# Patient Record
Sex: FEMALE | Race: WHITE | NOT HISPANIC OR LATINO | Employment: FULL TIME | URBAN - METROPOLITAN AREA
[De-identification: names, ages, dates, MRNs, and addresses within clinical notes are randomized per-mention and may not be internally consistent; named-entity substitution may affect disease eponyms.]

---

## 2024-07-25 ENCOUNTER — APPOINTMENT (OUTPATIENT)
Dept: CARDIOLOGY | Facility: HOSPITAL | Age: 29
End: 2024-07-25

## 2024-07-25 ENCOUNTER — HOSPITAL ENCOUNTER (EMERGENCY)
Facility: HOSPITAL | Age: 29
Discharge: HOME | End: 2024-07-25

## 2024-07-25 VITALS
OXYGEN SATURATION: 98 % | HEART RATE: 96 BPM | WEIGHT: 225 LBS | SYSTOLIC BLOOD PRESSURE: 134 MMHG | BODY MASS INDEX: 37.49 KG/M2 | DIASTOLIC BLOOD PRESSURE: 85 MMHG | TEMPERATURE: 97 F | RESPIRATION RATE: 20 BRPM | HEIGHT: 65 IN

## 2024-07-25 DIAGNOSIS — F41.9 ANXIETY: Primary | ICD-10-CM

## 2024-07-25 LAB
ALBUMIN SERPL BCP-MCNC: 4.3 G/DL (ref 3.4–5)
ALP SERPL-CCNC: 61 U/L (ref 33–110)
ALT SERPL W P-5'-P-CCNC: 13 U/L (ref 7–45)
ANION GAP SERPL CALC-SCNC: 16 MMOL/L (ref 10–20)
APPEARANCE UR: CLEAR
AST SERPL W P-5'-P-CCNC: 13 U/L (ref 9–39)
BASOPHILS # BLD AUTO: 0.04 X10*3/UL (ref 0–0.1)
BASOPHILS NFR BLD AUTO: 0.4 %
BILIRUB SERPL-MCNC: 0.6 MG/DL (ref 0–1.2)
BILIRUB UR STRIP.AUTO-MCNC: NEGATIVE MG/DL
BUN SERPL-MCNC: 9 MG/DL (ref 6–23)
CALCIUM SERPL-MCNC: 9.4 MG/DL (ref 8.6–10.3)
CHLORIDE SERPL-SCNC: 104 MMOL/L (ref 98–107)
CO2 SERPL-SCNC: 21 MMOL/L (ref 21–32)
COLOR UR: ABNORMAL
CREAT SERPL-MCNC: 0.68 MG/DL (ref 0.5–1.05)
D DIMER PPP FEU-MCNC: 306 NG/ML FEU
EGFRCR SERPLBLD CKD-EPI 2021: >90 ML/MIN/1.73M*2
EOSINOPHIL # BLD AUTO: 0.03 X10*3/UL (ref 0–0.7)
EOSINOPHIL NFR BLD AUTO: 0.3 %
ERYTHROCYTE [DISTWIDTH] IN BLOOD BY AUTOMATED COUNT: 12.2 % (ref 11.5–14.5)
GLUCOSE SERPL-MCNC: 90 MG/DL (ref 74–99)
GLUCOSE UR STRIP.AUTO-MCNC: NORMAL MG/DL
HCG UR QL IA.RAPID: NEGATIVE
HCT VFR BLD AUTO: 41.3 % (ref 36–46)
HGB BLD-MCNC: 14.4 G/DL (ref 12–16)
IMM GRANULOCYTES # BLD AUTO: 0.02 X10*3/UL (ref 0–0.7)
IMM GRANULOCYTES NFR BLD AUTO: 0.2 % (ref 0–0.9)
KETONES UR STRIP.AUTO-MCNC: ABNORMAL MG/DL
LEUKOCYTE ESTERASE UR QL STRIP.AUTO: NEGATIVE
LYMPHOCYTES # BLD AUTO: 1.75 X10*3/UL (ref 1.2–4.8)
LYMPHOCYTES NFR BLD AUTO: 18.7 %
MAGNESIUM SERPL-MCNC: 1.77 MG/DL (ref 1.6–2.4)
MCH RBC QN AUTO: 31.6 PG (ref 26–34)
MCHC RBC AUTO-ENTMCNC: 34.9 G/DL (ref 32–36)
MCV RBC AUTO: 91 FL (ref 80–100)
MONOCYTES # BLD AUTO: 0.35 X10*3/UL (ref 0.1–1)
MONOCYTES NFR BLD AUTO: 3.7 %
NEUTROPHILS # BLD AUTO: 7.16 X10*3/UL (ref 1.2–7.7)
NEUTROPHILS NFR BLD AUTO: 76.7 %
NITRITE UR QL STRIP.AUTO: NEGATIVE
NRBC BLD-RTO: 0 /100 WBCS (ref 0–0)
PH UR STRIP.AUTO: 6.5 [PH]
PHOSPHATE SERPL-MCNC: 2.1 MG/DL (ref 2.5–4.9)
PLATELET # BLD AUTO: 367 X10*3/UL (ref 150–450)
POTASSIUM SERPL-SCNC: 3.6 MMOL/L (ref 3.5–5.3)
PROT SERPL-MCNC: 7.7 G/DL (ref 6.4–8.2)
PROT UR STRIP.AUTO-MCNC: NEGATIVE MG/DL
RBC # BLD AUTO: 4.56 X10*6/UL (ref 4–5.2)
RBC # UR STRIP.AUTO: NEGATIVE /UL
SODIUM SERPL-SCNC: 137 MMOL/L (ref 136–145)
SP GR UR STRIP.AUTO: 1.01
TSH SERPL-ACNC: 0.58 MIU/L (ref 0.44–3.98)
UROBILINOGEN UR STRIP.AUTO-MCNC: NORMAL MG/DL
WBC # BLD AUTO: 9.4 X10*3/UL (ref 4.4–11.3)

## 2024-07-25 PROCEDURE — 84100 ASSAY OF PHOSPHORUS: CPT | Performed by: NURSE PRACTITIONER

## 2024-07-25 PROCEDURE — 81003 URINALYSIS AUTO W/O SCOPE: CPT | Performed by: NURSE PRACTITIONER

## 2024-07-25 PROCEDURE — 99283 EMERGENCY DEPT VISIT LOW MDM: CPT

## 2024-07-25 PROCEDURE — 83735 ASSAY OF MAGNESIUM: CPT | Performed by: NURSE PRACTITIONER

## 2024-07-25 PROCEDURE — 93005 ELECTROCARDIOGRAM TRACING: CPT

## 2024-07-25 PROCEDURE — 85025 COMPLETE CBC W/AUTO DIFF WBC: CPT | Performed by: NURSE PRACTITIONER

## 2024-07-25 PROCEDURE — 85379 FIBRIN DEGRADATION QUANT: CPT | Performed by: NURSE PRACTITIONER

## 2024-07-25 PROCEDURE — 81025 URINE PREGNANCY TEST: CPT | Performed by: NURSE PRACTITIONER

## 2024-07-25 PROCEDURE — 36415 COLL VENOUS BLD VENIPUNCTURE: CPT | Performed by: NURSE PRACTITIONER

## 2024-07-25 PROCEDURE — 84075 ASSAY ALKALINE PHOSPHATASE: CPT | Performed by: NURSE PRACTITIONER

## 2024-07-25 PROCEDURE — 2500000001 HC RX 250 WO HCPCS SELF ADMINISTERED DRUGS (ALT 637 FOR MEDICARE OP): Performed by: NURSE PRACTITIONER

## 2024-07-25 PROCEDURE — 84443 ASSAY THYROID STIM HORMONE: CPT | Performed by: NURSE PRACTITIONER

## 2024-07-25 RX ORDER — HYDROXYZINE HYDROCHLORIDE 25 MG/1
25 TABLET, FILM COATED ORAL ONCE
Status: COMPLETED | OUTPATIENT
Start: 2024-07-25 | End: 2024-07-25

## 2024-07-25 RX ORDER — HYDROXYZINE PAMOATE 25 MG/1
25 CAPSULE ORAL 3 TIMES DAILY PRN
Qty: 30 CAPSULE | Refills: 0 | Status: SHIPPED | OUTPATIENT
Start: 2024-07-25 | End: 2024-08-04

## 2024-07-25 RX ADMIN — HYDROXYZINE HYDROCHLORIDE 25 MG: 25 TABLET ORAL at 16:00

## 2024-07-25 ASSESSMENT — COLUMBIA-SUICIDE SEVERITY RATING SCALE - C-SSRS
2. HAVE YOU ACTUALLY HAD ANY THOUGHTS OF KILLING YOURSELF?: NO
6. HAVE YOU EVER DONE ANYTHING, STARTED TO DO ANYTHING, OR PREPARED TO DO ANYTHING TO END YOUR LIFE?: NO
1. IN THE PAST MONTH, HAVE YOU WISHED YOU WERE DEAD OR WISHED YOU COULD GO TO SLEEP AND NOT WAKE UP?: NO

## 2024-07-25 ASSESSMENT — PAIN - FUNCTIONAL ASSESSMENT
PAIN_FUNCTIONAL_ASSESSMENT: 0-10
PAIN_FUNCTIONAL_ASSESSMENT: 0-10

## 2024-07-25 ASSESSMENT — PAIN SCALES - GENERAL
PAINLEVEL_OUTOF10: 0 - NO PAIN
PAINLEVEL_OUTOF10: 0 - NO PAIN

## 2024-07-25 NOTE — ED PROVIDER NOTES
HPI   Chief Complaint   Patient presents with    Anxiety       This is a 29 y.o. female with a PMHx of anxiety, GERD, and marijuana use presenting with 2 days of anxiety. She states she smoked marijuana last night and had severe anxiety after for 3hr before she fell asleep. Her partner is at bedside and also smoked the weed without similar symptoms. She then woke up today and the anxiety began again and has not stopped. She has had anxiety and panic attacks in the past but nothing to this nature. They usually last ~10 minutes and subside. No medications at home. No interventions at home for her anxiety. Denies any medical hx other than GERD. No cp, sob, n/v/d, syncope, dizziness, or seizures. Denies thoughts of harming herself or others.               Patient History   Past Medical History:   Diagnosis Date    Anxiety     GERD (gastroesophageal reflux disease)      History reviewed. No pertinent surgical history.  No family history on file.  Social History     Tobacco Use    Smoking status: Former     Types: Cigarettes    Smokeless tobacco: Never   Vaping Use    Vaping status: Every Day   Substance Use Topics    Alcohol use: Yes     Comment: rare    Drug use: Yes     Types: Marijuana       Physical Exam   ED Triage Vitals [07/25/24 1501]   Temperature Heart Rate Respirations BP   36.1 °C (97 °F) (!) 101 20 130/82      Pulse Ox Temp Source Heart Rate Source Patient Position   99 % Skin -- --      BP Location FiO2 (%)     -- --       Physical Exam  Vitals and nursing note reviewed.   Constitutional:       Appearance: Normal appearance. She is normal weight.   HENT:      Head: Normocephalic and atraumatic.      Right Ear: External ear normal.      Left Ear: External ear normal.      Nose: Nose normal.      Mouth/Throat:      Pharynx: Oropharynx is clear.   Eyes:      Extraocular Movements: Extraocular movements intact.      Conjunctiva/sclera: Conjunctivae normal.   Cardiovascular:      Rate and Rhythm: Normal rate and  regular rhythm.      Pulses: Normal pulses.   Pulmonary:      Effort: Pulmonary effort is normal.      Breath sounds: Normal breath sounds.   Abdominal:      General: Bowel sounds are normal.      Palpations: Abdomen is soft.   Genitourinary:     Comments: No CVA tenderness or pubic pain.  Musculoskeletal:         General: Normal range of motion.   Skin:     General: Skin is warm and dry.      Capillary Refill: Capillary refill takes less than 2 seconds.   Neurological:      General: No focal deficit present.      Mental Status: She is alert and oriented to person, place, and time.   Psychiatric:         Attention and Perception: Attention normal.         Mood and Affect: Mood normal.         Speech: Speech is rapid and pressured.         Behavior: Behavior is cooperative.         Thought Content: Thought content normal.         Cognition and Memory: Cognition normal.         Judgment: Judgment normal.           ED Course & MDM   ED Course as of 07/25/24 1816   u Jul 25, 2024   1649 ECG 12 lead  Twelve-lead EKG interpreted by me.  Sinus rhythm at a rate of 67.  ND interval is 140, QRS is 86, QT is 364, QTc is 385.  Normal axis.  Normal interval.  No acute ischemia or injury pattern noted. [CT]      ED Course User Index  [CT] Coral Gallego, APRN-CNP         Diagnoses as of 07/25/24 1816   Anxiety                       [place] Coma Scale Score: 15                        Medical Decision Making  Patient was seen and evaluated with the nurse practitioner, Coral Galelgo.  The patient is presenting to the emergency room with complaints of increased anxiety.  The patient is not experiencing any chest pain.  Denies any fever or cold-like symptoms.  The patient states that she is not on any supplements.  Differential diagnosis includes ACS, pulmonary embolism, pneumonia, viral illness, electrolyte imbalance, arrhythmia, dehydration, or other acute process.  A twelve-lead EKG was obtained and showed sinus rhythm with no  acute ischemia or arrhythmia process.  A saline lock was established.  Laboratory studies were drawn with results as noted.  The patient's laboratory studies were largely unremarkable.  She did not have a evidence of infectious process, electrolyte imbalance, or renal/liver dysfunction.  A D-dimer was obtained and was negative.  TSH was within normal limits.  Routine urinalysis did not show any signs of infection.  Patient was medicated with Atarax 25 mg p.o.  She reported improvement of her anxiety symptoms after medication administration.  Patient does not have any suicidal or homicidal ideation.  Patient does not appear to be in any acute distress.  The patient was provided prescription for Vistaril for home administration.  She was given mental health referral for [facility].  She was encouraged to return if she has any worsening symptomatology.  The patient was discharged in stable condition with computer discharge instructions given.        Procedure  Procedures     TAMI Huddleston  07/25/24 1821       CARLOS A Huddleston-ROC  07/25/24 1821

## 2024-07-25 NOTE — ED TRIAGE NOTES
Pt to Ed with c/o anxiety attack. Pt does not know what triggered her anxiety. Pt reports she is a daily thc user and began feeling this way from [id number]. Pt states she vomited and then fell asleep. Pt woke up for work this morning and went right back into her anxiety attack. Pt denies THC use today. Denies pain. Pt does not take medications for anxiety.

## 2024-07-26 LAB — HOLD SPECIMEN: NORMAL

## 2024-07-27 LAB
ATRIAL RATE: 67 BPM
P AXIS: 49 DEGREES
PR INTERVAL: 140 MS
Q ONSET: 252 MS
QRS COUNT: 11 BEATS
QRS DURATION: 86 MS
QT INTERVAL: 364 MS
QTC CALCULATION(BAZETT): 385 MS
QTC FREDERICIA: 377 MS
R AXIS: 24 DEGREES
T AXIS: 13 DEGREES
T OFFSET: 434 MS
VENTRICULAR RATE: 67 BPM

## 2024-08-29 ENCOUNTER — HOSPITAL ENCOUNTER (EMERGENCY)
Facility: HOSPITAL | Age: 29
Discharge: HOME | End: 2024-08-29
Attending: EMERGENCY MEDICINE

## 2024-08-29 VITALS
OXYGEN SATURATION: 99 % | BODY MASS INDEX: 35.82 KG/M2 | HEART RATE: 85 BPM | WEIGHT: 215 LBS | DIASTOLIC BLOOD PRESSURE: 82 MMHG | SYSTOLIC BLOOD PRESSURE: 119 MMHG | RESPIRATION RATE: 16 BRPM | TEMPERATURE: 97.7 F | HEIGHT: 65 IN

## 2024-08-29 DIAGNOSIS — K08.89 PAIN, DENTAL: Primary | ICD-10-CM

## 2024-08-29 PROCEDURE — 2500000004 HC RX 250 GENERAL PHARMACY W/ HCPCS (ALT 636 FOR OP/ED): Performed by: EMERGENCY MEDICINE

## 2024-08-29 PROCEDURE — 2500000001 HC RX 250 WO HCPCS SELF ADMINISTERED DRUGS (ALT 637 FOR MEDICARE OP): Performed by: EMERGENCY MEDICINE

## 2024-08-29 PROCEDURE — 99283 EMERGENCY DEPT VISIT LOW MDM: CPT

## 2024-08-29 PROCEDURE — 96372 THER/PROPH/DIAG INJ SC/IM: CPT | Performed by: EMERGENCY MEDICINE

## 2024-08-29 RX ORDER — CHLORHEXIDINE GLUCONATE ORAL RINSE 1.2 MG/ML
15 SOLUTION DENTAL AS NEEDED
Qty: 250 ML | Refills: 0 | Status: SHIPPED | OUTPATIENT
Start: 2024-08-29 | End: 2024-09-13

## 2024-08-29 RX ORDER — CLINDAMYCIN HYDROCHLORIDE 300 MG/1
300 CAPSULE ORAL 3 TIMES DAILY
Qty: 30 CAPSULE | Refills: 0 | Status: SHIPPED | OUTPATIENT
Start: 2024-08-29 | End: 2024-09-08

## 2024-08-29 RX ORDER — OXYCODONE HYDROCHLORIDE 5 MG/1
5 TABLET ORAL ONCE
Status: COMPLETED | OUTPATIENT
Start: 2024-08-29 | End: 2024-08-29

## 2024-08-29 RX ORDER — OXYCODONE HYDROCHLORIDE 5 MG/1
5 TABLET ORAL EVERY 6 HOURS PRN
Qty: 12 TABLET | Refills: 0 | Status: SHIPPED | OUTPATIENT
Start: 2024-08-29 | End: 2024-09-01

## 2024-08-29 RX ORDER — KETOROLAC TROMETHAMINE 30 MG/ML
30 INJECTION, SOLUTION INTRAMUSCULAR; INTRAVENOUS ONCE
Status: COMPLETED | OUTPATIENT
Start: 2024-08-29 | End: 2024-08-29

## 2024-08-29 RX ADMIN — OXYCODONE HYDROCHLORIDE 5 MG: 5 TABLET ORAL at 03:20

## 2024-08-29 RX ADMIN — KETOROLAC TROMETHAMINE 30 MG: 30 INJECTION, SOLUTION INTRAMUSCULAR at 03:20

## 2024-08-29 ASSESSMENT — LIFESTYLE VARIABLES
EVER FELT BAD OR GUILTY ABOUT YOUR DRINKING: NO
HAVE YOU EVER FELT YOU SHOULD CUT DOWN ON YOUR DRINKING: NO
EVER HAD A DRINK FIRST THING IN THE MORNING TO STEADY YOUR NERVES TO GET RID OF A HANGOVER: NO
HAVE PEOPLE ANNOYED YOU BY CRITICIZING YOUR DRINKING: NO
TOTAL SCORE: 0

## 2024-08-29 ASSESSMENT — COLUMBIA-SUICIDE SEVERITY RATING SCALE - C-SSRS
1. IN THE PAST MONTH, HAVE YOU WISHED YOU WERE DEAD OR WISHED YOU COULD GO TO SLEEP AND NOT WAKE UP?: NO
6. HAVE YOU EVER DONE ANYTHING, STARTED TO DO ANYTHING, OR PREPARED TO DO ANYTHING TO END YOUR LIFE?: NO
2. HAVE YOU ACTUALLY HAD ANY THOUGHTS OF KILLING YOURSELF?: NO

## 2024-08-29 ASSESSMENT — PAIN DESCRIPTION - PAIN TYPE: TYPE: ACUTE PAIN

## 2024-08-29 ASSESSMENT — PAIN SCALES - GENERAL: PAINLEVEL_OUTOF10: 10 - WORST POSSIBLE PAIN

## 2024-08-29 ASSESSMENT — PAIN DESCRIPTION - LOCATION: LOCATION: TEETH

## 2024-08-29 ASSESSMENT — PAIN DESCRIPTION - ORIENTATION: ORIENTATION: LEFT

## 2024-08-29 ASSESSMENT — PAIN - FUNCTIONAL ASSESSMENT: PAIN_FUNCTIONAL_ASSESSMENT: 0-10

## 2024-08-29 NOTE — ED PROVIDER NOTES
HPI   Chief Complaint   Patient presents with    Dental Pain     Pt c/o left lower dental pain with a history or a broken molar on that side, pt found an old clindamycin prescription that she started taking today       HPI  HISTORY OF PRESENT ILLNESS:  Patient is a 2-year-old female presents to the emergency department dental pain.  She woke up Wednesday morning with left-sided dental pain.  It felt like it could have been both upper and lower.  This continued despite taking Tylenol 1000 mg and ibuprofen 800 mg.  The patient also started to take some leftover clindamycin.  She has not been able to make a dental appointment.  She has had remote dental fracture with the left lower tooth but has not had any recent trauma.  Denied any discharge, fever, chills, trismus, difficulty controlling secretions.      Past Medical History: Anxiety, acid reflux    Social History: Patient vapes.  States she has not used marijuana since July    __________________________________________________________  PHYSICAL EXAM:    Appearance:  female, appears stated age, appears to be in mild distress secondary to pain, alert, oriented , cooperative   Skin: Intact,  dry skin, no lesions, rash, petechiae or purpura.   Eyes: PERRLA, EOMs intact,  Conjunctiva pink with no redness or exudates.    HENT: Normocephalic, atraumatic. Nares patent.  Poor dentition visualized, dental fracture that appeared old and dental tooth #18 in addition to cavity and #15.  No swelling concerning for abscess  Neck: Supple. Trachea at midline.   Pulmonary: Lung sounds are clear bilaterally.  There is no rales, rhonchi, or wheezing.  Cardiac: Regular rate and rhythm, no rubs, murmurs, or gallops. No JVD,   Abdomen: Abdomen is soft, nontender, and nondistended.  No palpable organomegaly.  No rebound or guarding.  No CVA tenderness. Nonsurgical abdomen  Genitourinary: Exam deferred.  Musculoskeletal: no edema, pain, cyanosis, or deformity in extremities.  Pulses full and equal.   Neurological:  Cranial nerves are grossly intact, grossly normal sensation, no weakness, no focal findings identified.    __________________________________________________________  MEDICAL DECISION MAKING:    Patient was seen and examined.  Patient presented to the emergency department for dental pain.  Does appear to have likely cavity.  No drainable abscess.  The patient denied any trismus, fever, chills.  She has been taking Tylenol ibuprofen at max doses without significant relief of pain.  Was advised to follow-up with a dentist.  Was given a list of dentist in the area.  Given that the patient was already on clindamycin, will continue.  Patient is allergic to penicillins.  Was provided pain medicine.  Was given return precautions.  Prescriptions were written.  [facility] report reviewed.  Patient was discharged on emergency department    Eddie Gray  Emergency Medicine      Patient History   Past Medical History:   Diagnosis Date    Anxiety     GERD (gastroesophageal reflux disease)      History reviewed. No pertinent surgical history.  No family history on file.  Social History     Tobacco Use    Smoking status: Former     Types: Cigarettes    Smokeless tobacco: Never   Vaping Use    Vaping status: Every Day    Substances: Nicotine   Substance Use Topics    Alcohol use: Yes     Comment: rare    Drug use: Not Currently     Types: Marijuana       Physical Exam   ED Triage Vitals [08/29/24 0151]   Temperature Heart Rate Respirations BP   36.5 °C (97.7 °F) 85 16 119/82      Pulse Ox Temp src Heart Rate Source Patient Position   99 % -- -- --      BP Location FiO2 (%)     -- --       Physical Exam      ED Course & MDM   Diagnoses as of 08/29/24 0309   Pain, dental                 No data recorded     [place] Coma Scale Score: 15 (08/29/24 0155 : Eve Alonso, [email or web address])                           Medical Decision Making      Procedure  Procedures     Eddie Gray,   08/29/24 0309